# Patient Record
Sex: MALE | Employment: FULL TIME | URBAN - METROPOLITAN AREA
[De-identification: names, ages, dates, MRNs, and addresses within clinical notes are randomized per-mention and may not be internally consistent; named-entity substitution may affect disease eponyms.]

---

## 2024-04-17 ENCOUNTER — LAB ENCOUNTER (OUTPATIENT)
Dept: LAB | Facility: REFERENCE LAB | Age: 44
End: 2024-04-17
Attending: INTERNAL MEDICINE
Payer: COMMERCIAL

## 2024-04-17 ENCOUNTER — OFFICE VISIT (OUTPATIENT)
Dept: INTERNAL MEDICINE CLINIC | Facility: CLINIC | Age: 44
End: 2024-04-17
Payer: COMMERCIAL

## 2024-04-17 VITALS
HEART RATE: 86 BPM | OXYGEN SATURATION: 97 % | DIASTOLIC BLOOD PRESSURE: 80 MMHG | SYSTOLIC BLOOD PRESSURE: 138 MMHG | WEIGHT: 181 LBS

## 2024-04-17 DIAGNOSIS — E78.5 DYSLIPIDEMIA: ICD-10-CM

## 2024-04-17 DIAGNOSIS — I10 ESSENTIAL HYPERTENSION: ICD-10-CM

## 2024-04-17 DIAGNOSIS — R73.03 PREDIABETES: Primary | ICD-10-CM

## 2024-04-17 DIAGNOSIS — R73.03 PREDIABETES: ICD-10-CM

## 2024-04-17 LAB
ALBUMIN SERPL-MCNC: 4.5 G/DL (ref 3.2–4.8)
ALBUMIN/GLOB SERPL: 1.5 {RATIO} (ref 1–2)
ALP LIVER SERPL-CCNC: 68 U/L
ALT SERPL-CCNC: 26 U/L
ANION GAP SERPL CALC-SCNC: 7 MMOL/L (ref 0–18)
AST SERPL-CCNC: 27 U/L (ref ?–34)
BILIRUB SERPL-MCNC: 0.5 MG/DL (ref 0.3–1.2)
BUN BLD-MCNC: 12 MG/DL (ref 9–23)
BUN/CREAT SERPL: 10.7 (ref 10–20)
CALCIUM BLD-MCNC: 9.8 MG/DL (ref 8.7–10.4)
CHLORIDE SERPL-SCNC: 105 MMOL/L (ref 98–112)
CHOLEST SERPL-MCNC: 166 MG/DL (ref ?–200)
CO2 SERPL-SCNC: 30 MMOL/L (ref 21–32)
CREAT BLD-MCNC: 1.12 MG/DL
EGFRCR SERPLBLD CKD-EPI 2021: 84 ML/MIN/1.73M2 (ref 60–?)
EST. AVERAGE GLUCOSE BLD GHB EST-MCNC: 114 MG/DL (ref 68–126)
FASTING PATIENT LIPID ANSWER: NO
FASTING STATUS PATIENT QL REPORTED: NO
GLOBULIN PLAS-MCNC: 3 G/DL (ref 2.8–4.4)
GLUCOSE BLD-MCNC: 85 MG/DL (ref 70–99)
HBA1C MFR BLD: 5.6 % (ref ?–5.7)
HDLC SERPL-MCNC: 32 MG/DL (ref 40–59)
LDLC SERPL CALC-MCNC: 61 MG/DL (ref ?–100)
NONHDLC SERPL-MCNC: 134 MG/DL (ref ?–130)
OSMOLALITY SERPL CALC.SUM OF ELEC: 293 MOSM/KG (ref 275–295)
POTASSIUM SERPL-SCNC: 4 MMOL/L (ref 3.5–5.1)
PROT SERPL-MCNC: 7.5 G/DL (ref 5.7–8.2)
SODIUM SERPL-SCNC: 142 MMOL/L (ref 136–145)
TRIGL SERPL-MCNC: 483 MG/DL (ref 30–149)
VLDLC SERPL CALC-MCNC: 72 MG/DL (ref 0–30)

## 2024-04-17 PROCEDURE — 3075F SYST BP GE 130 - 139MM HG: CPT | Performed by: INTERNAL MEDICINE

## 2024-04-17 PROCEDURE — 99204 OFFICE O/P NEW MOD 45 MIN: CPT | Performed by: INTERNAL MEDICINE

## 2024-04-17 PROCEDURE — 80053 COMPREHEN METABOLIC PANEL: CPT | Performed by: INTERNAL MEDICINE

## 2024-04-17 PROCEDURE — 83036 HEMOGLOBIN GLYCOSYLATED A1C: CPT | Performed by: INTERNAL MEDICINE

## 2024-04-17 PROCEDURE — 96127 BRIEF EMOTIONAL/BEHAV ASSMT: CPT | Performed by: INTERNAL MEDICINE

## 2024-04-17 PROCEDURE — 3079F DIAST BP 80-89 MM HG: CPT | Performed by: INTERNAL MEDICINE

## 2024-04-17 PROCEDURE — 80061 LIPID PANEL: CPT | Performed by: INTERNAL MEDICINE

## 2024-04-17 RX ORDER — AMLODIPINE BESYLATE 10 MG/1
10 TABLET ORAL DAILY
COMMUNITY
Start: 2024-01-09

## 2024-04-17 RX ORDER — OMEGA-3-ACID ETHYL ESTERS 1 G/1
2 CAPSULE, LIQUID FILLED ORAL 2 TIMES DAILY
COMMUNITY
Start: 2024-04-05

## 2024-04-17 NOTE — PROGRESS NOTES
Ilya Farrell male 44 year old    New patient.  [place]  has 2 daughters is a fan of [employer]     Chief Complaint   Patient presents with    Establish Care     Has  hypertension  . Prediabetes  ,   high trigs  low hdl      No hosp stays  , ankle  surgery - ocass  beer , no tobacco     Patient Active Problem List   Diagnosis    Essential hypertension    Dyslipidemia    Prediabetes      Review of systems negative    Current Outpatient Medications on File Prior to Visit   Medication Sig Dispense Refill    amLODIPine 10 MG Oral Tab Take 1 tablet (10 mg total) by mouth daily.      omega-3-acid ethyl esters 1 g Oral Cap Take 2 capsules (2 g total) by mouth 2 (two) times daily.       No current facility-administered medications on file prior to visit.        HISTORY:  Past Medical History:    Essential hypertension      History reviewed. No pertinent surgical history.   Family History   Problem Relation Age of Onset    No Known Problems Father     Hypertension Mother     No Known Problems Daughter       Social History     Socioeconomic History    Marital status:    Tobacco Use    Smoking status: Never    Smokeless tobacco: Never   Vaping Use    Vaping status: Never Used   Substance and Sexual Activity    Alcohol use: Yes     Comment: ocassional    Drug use: Never     Social Determinants of Health     Financial Resource Strain: Low Risk  (4/25/2022)    Received from [facility]    Overall Financial Resource Strain (CARDIA)     Difficulty of Paying Living Expenses: Not hard at all   Food Insecurity: No Food Insecurity (10/23/2023)    Received from [facility]    Food Insecurity     Within the past 30 days, I worried whether my food would run out before I got money to buy more. / En los últimos 30 días, me preocupó que la comida se podía acabar antes de tener dinero para compr...: Never true / Nunca     Within the past 30 days,  the food that I bought just didn't last, and I didn't have money to get more. / En los últimos 30 días, La comida que compré no rindió lo suficiente, y no tenía dinero para...: Never true / Nunca   Transportation Needs: No Transportation Needs (3/15/2021)    Received from [facility]    PRAPARE - Transportation     Lack of Transportation (Medical): No     Lack of Transportation (Non-Medical): No   Physical Activity: Inactive (12/14/2020)    Received from [facility]    Exercise Vital Sign     Days of Exercise per Week: 0 days     Minutes of Exercise per Session: 0 min   Housing Stability: Unknown (4/25/2022)    Received from [facility]    Housing Stability Vital Sign     Unable to Pay for Housing in the Last Year: No     Unstable Housing in the Last Year: No             Vitals:    04/17/24 1048   BP: 146/72   Pulse: 86   VITALSThere is no height or weight on file to calculate BMI.    Pertinent findings on the physical exam; none  healthy      Ilya was seen today for establish care.    Diagnoses and all orders for this visit:    Prediabetes    Dyslipidemia    Essential hypertension     Discussed insulin resistance  and risk of becoming a diabetic in the near future and  wt loss     Check labs to see how A1c is now to see how his triglycerides are.    Blood pressure not at goal.  Currently on amlodipine we will continue it.    See me in 2-3 mos for follow-up of blood pressure and labs.    Recommend to stay on fish oil.    I reviewed his previous records in care everywhere.  A1c's have been in the 5.7-5.8 range.  Triglycerides as high as 700.        This note was prepared using Dragon Medical voice recognition dictation software and as a result, errors may occur. When identified, these errors have been corrected. While every attempt is made to correct errors during dictation, discrepancies may still exist

## 2024-07-22 ENCOUNTER — OFFICE VISIT (OUTPATIENT)
Dept: INTERNAL MEDICINE CLINIC | Facility: CLINIC | Age: 44
End: 2024-07-22
Payer: COMMERCIAL

## 2024-07-22 VITALS
HEART RATE: 90 BPM | WEIGHT: 186 LBS | SYSTOLIC BLOOD PRESSURE: 134 MMHG | RESPIRATION RATE: 18 BRPM | OXYGEN SATURATION: 99 % | DIASTOLIC BLOOD PRESSURE: 82 MMHG

## 2024-07-22 DIAGNOSIS — I10 ESSENTIAL HYPERTENSION: ICD-10-CM

## 2024-07-22 DIAGNOSIS — E88.810 METABOLIC SYNDROME: Primary | ICD-10-CM

## 2024-07-22 PROCEDURE — 3079F DIAST BP 80-89 MM HG: CPT | Performed by: INTERNAL MEDICINE

## 2024-07-22 PROCEDURE — 3075F SYST BP GE 130 - 139MM HG: CPT | Performed by: INTERNAL MEDICINE

## 2024-07-22 PROCEDURE — 99213 OFFICE O/P EST LOW 20 MIN: CPT | Performed by: INTERNAL MEDICINE

## 2024-07-22 NOTE — PROGRESS NOTES
Ilya Farrell male 44 year old         Chief Complaint   Patient presents with    Follow - Up     Pt is coming in for prediabetes and essential hypertension      Discussed  lipid  panel  - high  trigs  low  hdl     A1c  good      Bp  controlled    Patient Active Problem List   Diagnosis    Essential hypertension    Dyslipidemia    Prediabetes          Current Outpatient Medications on File Prior to Visit   Medication Sig Dispense Refill    amLODIPine 10 MG Oral Tab Take 1 tablet (10 mg total) by mouth daily.      omega-3-acid ethyl esters 1 g Oral Cap Take 2 capsules (2 g total) by mouth 2 (two) times daily.       No current facility-administered medications on file prior to visit.          Vitals:    07/22/24 1109   BP: 134/82   Pulse: 90   Resp: 18   VITALSThere is no height or weight on file to calculate BMI.    Pertinent findings on the physical exam; bp gr4at  cor  rehana Caraballo was seen today for follow - up.    Diagnoses and all orders for this visit:    Metabolic syndrome    Essential hypertension         Discussed sig  of  IR  and  met syndrome      Heart  scan since has  metabolic syndrome      Keep on  bp meds         This note was prepared using Dragon Medical voice recognition dictation software and as a result, errors may occur. When identified, these errors have been corrected. While every attempt is made to correct errors during dictation, discrepancies may still exist

## 2024-10-31 ENCOUNTER — HOSPITAL ENCOUNTER (OUTPATIENT)
Dept: CT IMAGING | Age: 44
Discharge: HOME | End: 2024-10-31
Attending: INTERNAL MEDICINE

## 2024-10-31 DIAGNOSIS — Z13.6 SCREENING FOR HEART DISEASE: ICD-10-CM

## 2024-11-04 ENCOUNTER — OFFICE VISIT (OUTPATIENT)
Dept: INTERNAL MEDICINE CLINIC | Facility: CLINIC | Age: 44
End: 2024-11-04
Payer: COMMERCIAL

## 2024-11-04 VITALS
RESPIRATION RATE: 18 BRPM | SYSTOLIC BLOOD PRESSURE: 136 MMHG | OXYGEN SATURATION: 98 % | WEIGHT: 187.19 LBS | HEART RATE: 76 BPM | DIASTOLIC BLOOD PRESSURE: 86 MMHG

## 2024-11-04 DIAGNOSIS — Z00.00 PREVENTATIVE HEALTH CARE: Primary | ICD-10-CM

## 2024-11-04 DIAGNOSIS — Z12.5 PROSTATE CANCER SCREENING: ICD-10-CM

## 2024-11-04 NOTE — PROGRESS NOTES
Ilya Farrell  44 year old     .    Is here for preventative wellness exam        Recent heart  scan looks great      Has high trigs  and low  HDL     On amlod  for bp            ROS    GENERAL:no systemic symptoms, screening for depression negative, does not drink excessively, no drug use,no smoing  no vaping      Discussed weight/BMI- 187         Wt Readings from Last 3 Encounters:   11/04/24 187 lb 3.2 oz (84.9 kg)   07/22/24 186 lb (84.4 kg)   04/17/24 181 lb (82.1 kg)           LUNGS:denies shortness of breath or resp symptoms    CARDIOVASCULAR: denies chest pain     GI:   ocass  upset  stomach  brief      ALL OTHER REVIEW IS NEGATIVE    No change in  SH or  FH      Patient Active Problem List   Diagnosis    Essential hypertension    Dyslipidemia    Prediabetes   History reviewed. No pertinent surgical history.   Family History   Problem Relation Age of Onset    No Known Problems Father     Hypertension Mother     No Known Problems Daughter      Social History     Socioeconomic History    Marital status:    Tobacco Use    Smoking status: Never    Smokeless tobacco: Never   Vaping Use    Vaping status: Never Used   Substance and Sexual Activity    Alcohol use: Yes     Alcohol/week: 3.0 standard drinks of alcohol     Types: 3 Cans of beer per week     Comment: ocassional    Drug use: Never   Other Topics Concern    Caffeine Concern No    Exercise No    Seat Belt No    Special Diet No    Stress Concern No    Weight Concern No     Social Drivers of Health     Financial Resource Strain: Low Risk  (4/25/2022)    Received from [email or web address]    Overall Financial Resource Strain (CARDIA)     Difficulty of Paying Living Expenses: Not hard at all   Food Insecurity: No Food Insecurity (10/23/2023)    Received from [facility]    Food Insecurity     Within the past 30 days, I worried whether my food would run out before I got money to buy more. / En los últimos 30 días, me  preocupó que la comida se podía acabar antes de tener dinero para compr...: Never true / Nunca     Within the past 30 days, the food that I bought just didn't last, and I didn't have money to get more. / En los últimos 30 días, La comida que compré no rindió lo suficiente, y no tenía dinero para...: Never true / Nunca   Transportation Needs: No Transportation Needs (3/15/2021)    Received from [facility]    PRAPARE - Transportation     Lack of Transportation (Medical): No     Lack of Transportation (Non-Medical): No   Physical Activity: Inactive (12/14/2020)    Received from [facility]    Exercise Vital Sign     Days of Exercise per Week: 0 days     Minutes of Exercise per Session: 0 min   Housing Stability: Unknown (4/25/2022)    Received from [facility]    Housing Stability Vital Sign     Unable to Pay for Housing in the Last Year: No     Unstable Housing in the Last Year: No      Current Outpatient Medications:     amLODIPine 10 MG Oral Tab, Take 1 tablet (10 mg total) by mouth daily., Disp: , Rfl:     omega-3-acid ethyl esters 1 g Oral Cap, Take 2 capsules (2 g total) by mouth 2 (two) times daily., Disp: , Rfl:              EXAM:     Vitals:    11/04/24 0904   BP: 136/86   Pulse: 76   Resp: 18   VITALSThere is no height or weight on file to calculate BMI.      Wt Readings from Last 3 Encounters:   11/04/24 187 lb 3.2 oz (84.9 kg)   07/22/24 186 lb (84.4 kg)   04/17/24 181 lb (82.1 kg)           BP Readings from Last 3 Encounters:   11/04/24 136/86   07/22/24 134/82   04/17/24 138/80           Appears Healthy    Neck: normal    Lungs: clear , nl     Heart:  Reg    Abdomen:  Nondistended    Extremities:  nl     Skin:  no suspicious lesions    Neurological: nl       Diagnoses and all orders for this visit:    Preventative health care  -     Hemoglobin A1C; Future  -     Lipid Panel; Future  -     Comp Metabolic Panel (14); Future  -     PSA Total,  Screen; Future    Prostate cancer screening  -     PSA Total, Screen; Future          Discussed importance of healthy lifestyle with exercise and diet.  Recommend to avoid tobacco  Keep alcohol use to a minimum  Discussed appropriate screening tests;  check labs in April  -    Discussed vaccines declines      This note was prepared using Dragon Medical voice recognition dictation software and as a result, errors may occur. When identified, these errors have been corrected. While every attempt is made to correct errors during dictation, discrepancies may still exist       Kedar Booker MD

## 2025-01-27 RX ORDER — AMLODIPINE BESYLATE 10 MG/1
10 TABLET ORAL DAILY
Qty: 90 TABLET | Refills: 1 | Status: SHIPPED | OUTPATIENT
Start: 2025-01-27

## 2025-07-29 RX ORDER — AMLODIPINE BESYLATE 10 MG/1
10 TABLET ORAL DAILY
Qty: 90 TABLET | Refills: 0 | Status: SHIPPED | OUTPATIENT
Start: 2025-07-29